# Patient Record
Sex: MALE | Race: WHITE | URBAN - METROPOLITAN AREA
[De-identification: names, ages, dates, MRNs, and addresses within clinical notes are randomized per-mention and may not be internally consistent; named-entity substitution may affect disease eponyms.]

---

## 2017-01-31 ENCOUNTER — IMPORTED ENCOUNTER (OUTPATIENT)
Dept: URBAN - METROPOLITAN AREA CLINIC 50 | Facility: CLINIC | Age: 57
End: 2017-01-31

## 2017-04-12 ENCOUNTER — IMPORTED ENCOUNTER (OUTPATIENT)
Dept: URBAN - METROPOLITAN AREA CLINIC 50 | Facility: CLINIC | Age: 57
End: 2017-04-12

## 2017-04-27 ENCOUNTER — IMPORTED ENCOUNTER (OUTPATIENT)
Dept: URBAN - METROPOLITAN AREA CLINIC 50 | Facility: CLINIC | Age: 57
End: 2017-04-27

## 2017-07-17 ENCOUNTER — IMPORTED ENCOUNTER (OUTPATIENT)
Dept: URBAN - METROPOLITAN AREA CLINIC 50 | Facility: CLINIC | Age: 57
End: 2017-07-17

## 2017-08-16 ENCOUNTER — IMPORTED ENCOUNTER (OUTPATIENT)
Dept: URBAN - METROPOLITAN AREA CLINIC 50 | Facility: CLINIC | Age: 57
End: 2017-08-16

## 2017-10-05 NOTE — PATIENT DISCUSSION
- OCT shows: borderline thin RNFL inferiorly OD (though with poor signal strength and peripapillary algorithm failure, likely due to cataract density)

## 2017-10-05 NOTE — PATIENT DISCUSSION
- Discussed the risks, benefits, and alternatives of cataract surgery including but not limited to infection, bleeding, posterior capsular tear, need for additional surgery including secondary IOL or vitrectomy, the continued need to wear glasses at both distance and near, and permanent loss of vision. There is no guarantee that cataract surgery will improve the patient's vision or reduce glare/halos.  The patient appears to understand and wishes to proceed with surgery, OS first.

## 2017-10-05 NOTE — PATIENT DISCUSSION
- I discussed a variety of IOL options with the patient and provided him with written information.  Discussed standard monofocal, multifocal, extended depth of focus, and toric IOL options

## 2017-10-17 NOTE — PATIENT DISCUSSION
- Discussed the risks, benefits, and alternatives of cataract surgery including but not limited to infection, bleeding, posterior capsular tear, need for additional surgery including secondary IOL or vitrectomy, the continued need to wear glasses at both distance and near, and permanent loss of vision. There is no guarantee that cataract surgery will improve the patient's vision or reduce glare/halos. The patient appears to understand and wishes to proceed with surgery.

## 2017-10-17 NOTE — PATIENT DISCUSSION
- Patient opts for standard monofocal IOL OU. He understands that he will likely still need glasses for both distance and near after surgery.

## 2017-10-17 NOTE — PATIENT DISCUSSION
- Patient has significant corneal astigmatism (&gt;1 diopter OU). Discussed the benefits of Toric IOLs and LRIs.

## 2017-12-20 NOTE — PATIENT DISCUSSION
Continue: Pred Forte (prednisolone acetate): drops,suspension: 1% 1 drop four times a day as directed into left eye 12-

## 2017-12-20 NOTE — PATIENT DISCUSSION
Continue: moxifloxacin (moxifloxacin): drops: 0.5% 1 drop four times a day as directed into left eye 12-

## 2017-12-28 NOTE — PATIENT DISCUSSION
- POD#1: Va 20/50, Ta 11, looks great, but still has approximately 2 diopters of astigmatism at 180 degrees

## 2017-12-28 NOTE — PATIENT DISCUSSION
Continue: Pred Forte (prednisolone acetate): drops,suspension: 1% 1 drop three times a day as directed into left eye 12-

## 2018-01-18 NOTE — PATIENT DISCUSSION
- New glasses Rx provided. Warned patient that he may have a significant difference in image size between the two eyes.

## 2018-01-18 NOTE — PATIENT DISCUSSION
- Will observe until his cataract becomes more significant.  Surgery would be slightly higher risk due to PXF

## 2018-02-23 NOTE — PATIENT DISCUSSION
- Narrow angle OD as well, will consider surgery earlier if his angle becomes narrower or if his glaucoma appears to be progressing

## 2018-06-07 NOTE — PATIENT DISCUSSION
POSTERIOR VITREOUS DETACHMENT, OU: NO HOLES. NO TEARS. RETINAL DETACHMENT WARNINGS DISCUSSED. FLOATERS AND FLASHES BROCHURE GIVEN. RETURN FOR FOLLOW-UP AS SCHEDULED.

## 2018-06-07 NOTE — PATIENT DISCUSSION
POAG, OU: INTRAOCULAR PRESSURE IS WITHIN ACCEPTABLE LIMITS. PT INSTRUCTED TO CONTINUE LATANOPROST QHS OD- AND START IN OS AS OF TODAY. RX ESCROBED TODAY- AND RETURN FOR FOLLOW-UP AS SCHEDULED.

## 2018-08-16 ENCOUNTER — IMPORTED ENCOUNTER (OUTPATIENT)
Dept: URBAN - METROPOLITAN AREA CLINIC 50 | Facility: CLINIC | Age: 58
End: 2018-08-16

## 2018-09-26 ENCOUNTER — IMPORTED ENCOUNTER (OUTPATIENT)
Dept: URBAN - METROPOLITAN AREA CLINIC 50 | Facility: CLINIC | Age: 58
End: 2018-09-26

## 2019-08-14 ENCOUNTER — PREPPED CHART (OUTPATIENT)
Dept: URBAN - METROPOLITAN AREA CLINIC 52 | Facility: CLINIC | Age: 59
End: 2019-08-14

## 2019-08-14 ENCOUNTER — IMPORTED ENCOUNTER (OUTPATIENT)
Dept: URBAN - METROPOLITAN AREA CLINIC 50 | Facility: CLINIC | Age: 59
End: 2019-08-14

## 2019-09-26 NOTE — PATIENT DISCUSSION
POAG, OU: INTRAOCULAR PRESSURE IS WITHIN ACCEPTABLE LIMITS. PT INSTRUCTED TO CONTINUE LATANOPROST QHS OU. E-SCRIBED REFILL TODAY. OCT SHOWS THINNING. WILL HAVE PT RETURN FOR FURTHER GLAUCOMA TESTING.

## 2020-09-24 NOTE — PATIENT DISCUSSION
09/24/2020-3.50+[id number]/30 -&nbsp;SN &nbsp; &nbsp; [facility]

## 2020-09-24 NOTE — PATIENT DISCUSSION
POAG, OU: INTRAOCULAR PRESSURE AND RNFL OCT ARE WITHIN NORMAL LIMITS. PT INSTRUCTED TO CONTINUE LATANOPROST QHS OU AND  TIMOLOL QAM OS AND RETURN FOR FOLLOW-UP AS SCHEDULED.  ESCRIBED REFILL OF LATANOPROST AND TIMOLOL TODAY

## 2021-03-26 NOTE — PATIENT DISCUSSION
POAG, OU: INTRAOCULAR PRESSURE IS WITHIN ACCEPTABLE LIMITS. PT INSTRUCTED TO CONTINUE LATANOPROST QHS OU AND TIMOLOL QAM OS AND RETURN FOR FOLLOW-UP AS SCHEDULED.

## 2021-04-17 ASSESSMENT — TONOMETRY
OS_IOP_MMHG: 16
OD_IOP_MMHG: 18
OS_IOP_MMHG: 15
OD_IOP_MMHG: 16
OS_IOP_MMHG: 18
OD_IOP_MMHG: 15
OS_IOP_MMHG: 15
OD_IOP_MMHG: 16

## 2021-04-17 ASSESSMENT — VISUAL ACUITY
OS_CC: J1+
OD_BAT: 20/30-1
OD_CC: 20/25-1
OD_CC: 20/20-2
OS_CC: 20/20-2
OS_CC: 20/25+1
OD_CC: J1+
OD_CC: J1+@ 14 IN
OS_CC: 20/20-
OS_OTHER: 20/25. 20/25-2.
OD_CC: 20/30+-
OS_CC: 20/20
OD_OTHER: 20/30-1. 20/30-1.
OS_CC: J1+@ 14 IN
OD_CC: 20/25-
OS_BAT: 20/25

## 2021-04-28 ENCOUNTER — ROUTINE EXAM (OUTPATIENT)
Dept: URBAN - METROPOLITAN AREA CLINIC 52 | Facility: CLINIC | Age: 61
End: 2021-04-28

## 2021-04-28 DIAGNOSIS — H04.123: ICD-10-CM

## 2021-04-28 DIAGNOSIS — H52.4: ICD-10-CM

## 2021-04-28 DIAGNOSIS — Z01.00: ICD-10-CM

## 2021-04-28 PROCEDURE — 92012 INTRM OPH EXAM EST PATIENT: CPT

## 2021-04-28 PROCEDURE — 92015 DETERMINE REFRACTIVE STATE: CPT

## 2021-04-28 ASSESSMENT — VISUAL ACUITY
OU_CC: 20/20
OU_CC: J1+
OD_CC: 20/20
OS_CC: 20/20

## 2021-04-28 ASSESSMENT — TONOMETRY
OS_IOP_MMHG: 16
OD_IOP_MMHG: 16

## 2021-04-28 NOTE — PATIENT DISCUSSION
Discussed lid hygiene, warm compress and eyelid wash. Advised patient to not use baby shampoo. Gave samples of Ocusoft Lid Scrubs. Will continue to monitor.

## 2021-04-28 NOTE — PATIENT DISCUSSION
Continue current management. Advised patient to not use Visine eye drops. Gave sample of Systane Complete. Will continue to monitor.

## 2021-07-09 NOTE — PATIENT DISCUSSION
POAG, OU: HVF STABLE OU. PT INSTRUCTED TO CONTINUE LATANOPROST QHS OU  AND RETURN FOR FOLLOW-UP AS SCHEDULED.

## 2022-10-03 NOTE — PATIENT DISCUSSION
PT INSTRUCTED TO RESTART LATANOPROST QHS OU, TIMOLOL QD OS,  PMH DISCUSSED WHEN SHE IS READY FOR CATARACT SURGERY AND HAS VISION COMPLAINTS WILL REFER TO PATIENT DR Miguel Inman FOR CATARACT/GLAUCOMA SURGERY.

## 2022-10-19 ENCOUNTER — COMPREHENSIVE EXAM (OUTPATIENT)
Dept: URBAN - METROPOLITAN AREA CLINIC 52 | Facility: CLINIC | Age: 62
End: 2022-10-19

## 2022-10-19 DIAGNOSIS — H02.051: ICD-10-CM

## 2022-10-19 DIAGNOSIS — H52.4: ICD-10-CM

## 2022-10-19 DIAGNOSIS — Z01.00: ICD-10-CM

## 2022-10-19 PROCEDURE — 67820 REVISE EYELASHES: CPT

## 2022-10-19 PROCEDURE — 92014 COMPRE OPH EXAM EST PT 1/>: CPT

## 2022-10-19 PROCEDURE — 92015 DETERMINE REFRACTIVE STATE: CPT

## 2022-10-19 ASSESSMENT — VISUAL ACUITY
OU_CC: 20/20-1
OU_CC: J1+
OS_CC: 20/25-2
OD_CC: 20/20-1

## 2022-10-19 ASSESSMENT — TONOMETRY
OS_IOP_MMHG: 13
OD_IOP_MMHG: 13

## 2022-10-19 NOTE — PATIENT DISCUSSION
IOP 13 OU. Due to large c/d will order baseline testing to include HVF 24-2 MIGUEL standard and OCT (RNFL). Will see patient back in 6 month for IOP check and HVF 24-2 MIGUEL standard and OCT (RNFL).

## 2022-10-19 NOTE — PROCEDURE NOTE: CLINICAL
PROCEDURE NOTE: Epilation Right Upper Lid. Diagnosis: Trichiasis without Entropion. Consent was obtained prior to the procedure. Patient was brought to slit lamp where trichiasis was/were removed using micro forceps. Patient tolerated procedure well. Cristine Holliday

## 2022-11-02 ENCOUNTER — DIAGNOSTICS ONLY (OUTPATIENT)
Dept: URBAN - METROPOLITAN AREA CLINIC 52 | Facility: CLINIC | Age: 62
End: 2022-11-02

## 2022-11-02 DIAGNOSIS — H40.013: ICD-10-CM

## 2022-11-02 PROCEDURE — 92083 EXTENDED VISUAL FIELD XM: CPT

## 2022-11-02 PROCEDURE — 92133 CPTRZD OPH DX IMG PST SGM ON: CPT

## 2023-05-17 ENCOUNTER — DIAGNOSTICS ONLY (OUTPATIENT)
Dept: URBAN - METROPOLITAN AREA CLINIC 52 | Facility: CLINIC | Age: 63
End: 2023-05-17

## 2023-05-17 DIAGNOSIS — H40.013: ICD-10-CM

## 2023-05-17 PROCEDURE — 92133 CPTRZD OPH DX IMG PST SGM ON: CPT

## 2023-05-17 PROCEDURE — 92083 EXTENDED VISUAL FIELD XM: CPT

## 2023-11-15 ENCOUNTER — COMPREHENSIVE EXAM (OUTPATIENT)
Dept: URBAN - METROPOLITAN AREA CLINIC 52 | Facility: CLINIC | Age: 63
End: 2023-11-15

## 2023-11-15 DIAGNOSIS — H52.4: ICD-10-CM

## 2023-11-15 DIAGNOSIS — Z01.00: ICD-10-CM

## 2023-11-15 PROCEDURE — 92015 DETERMINE REFRACTIVE STATE: CPT

## 2023-11-15 PROCEDURE — 92014 COMPRE OPH EXAM EST PT 1/>: CPT

## 2023-11-15 ASSESSMENT — KERATOMETRY
OD_AXISANGLE_DEGREES: 013
OS_K2POWER_DIOPTERS: 44.00
OS_AXISANGLE_DEGREES: 002
OS_K1POWER_DIOPTERS: 43.25
OD_K2POWER_DIOPTERS: 43.50
OS_AXISANGLE2_DEGREES: 92
OD_AXISANGLE2_DEGREES: 103
OD_K1POWER_DIOPTERS: 43.00

## 2023-11-15 ASSESSMENT — VISUAL ACUITY
OS_CC: 20/20
OD_CC: 20/20
OU_CC: J1+
OD_GLARE: 20/20
OS_GLARE: 20/20
OS_GLARE: 20/20
OU_CC: 20/20
OD_GLARE: 20/20

## 2023-11-15 ASSESSMENT — TONOMETRY
OD_IOP_MMHG: 16
OS_IOP_MMHG: 16

## 2025-08-04 ENCOUNTER — APPOINTMENT (OUTPATIENT)
Dept: URBAN - METROPOLITAN AREA CLINIC 164 | Facility: CLINIC | Age: 65
Setting detail: DERMATOLOGY
End: 2025-08-04

## 2025-08-04 DIAGNOSIS — L82.1 OTHER SEBORRHEIC KERATOSIS: ICD-10-CM

## 2025-08-04 DIAGNOSIS — A63.0 ANOGENITAL (VENEREAL) WARTS: ICD-10-CM | Status: INADEQUATELY CONTROLLED

## 2025-08-04 DIAGNOSIS — L40.8 OTHER PSORIASIS: ICD-10-CM | Status: INADEQUATELY CONTROLLED

## 2025-08-04 PROBLEM — D40.8 NEOPLASM OF UNCERTAIN BEHAVIOR OF OTHER SPECIFIED MALE GENITAL ORGANS: Status: ACTIVE | Noted: 2025-08-04

## 2025-08-04 PROBLEM — L60.9 NAIL DISORDER, UNSPECIFIED: Status: ACTIVE | Noted: 2025-08-04

## 2025-08-04 PROCEDURE — ? COUNSELING

## 2025-08-04 PROCEDURE — ? ADDITIONAL NOTES

## 2025-08-04 ASSESSMENT — LOCATION DETAILED DESCRIPTION DERM
LOCATION DETAILED: LEFT SUPERIOR PREAURICULAR CHEEK
LOCATION DETAILED: LEFT DISTAL ULNAR THUMB
LOCATION DETAILED: RIGHT SUPERIOR HELIX
LOCATION DETAILED: PERIUNGUAL SKIN RIGHT THUMB
LOCATION DETAILED: LEFT SUPERIOR LATERAL MALAR CHEEK
LOCATION DETAILED: LEFT DORSAL SHAFT OF PENIS

## 2025-08-04 ASSESSMENT — LOCATION SIMPLE DESCRIPTION DERM
LOCATION SIMPLE: RIGHT THUMB
LOCATION SIMPLE: PENIS
LOCATION SIMPLE: RIGHT EAR
LOCATION SIMPLE: LEFT CHEEK
LOCATION SIMPLE: LEFT THUMB

## 2025-08-04 ASSESSMENT — ITCH NUMERIC RATING SCALE: HOW SEVERE IS YOUR ITCHING?: 0

## 2025-08-04 ASSESSMENT — BSA PSORIASIS: % BODY COVERED IN PSORIASIS: 2

## 2025-08-04 ASSESSMENT — LOCATION ZONE DERM
LOCATION ZONE: FACE
LOCATION ZONE: FINGER
LOCATION ZONE: EAR
LOCATION ZONE: PENIS

## 2025-08-04 NOTE — HPI: PSORIASIS (PATIENT REPORTED)
Where Is Your Psoriasis Located?: Fingers
List Prescription Topical Steroids You Are Using (Separate Each Name With A Comma):: Clobetasol

## 2025-08-04 NOTE — PROCEDURE: ADDITIONAL NOTES
Render Risk Assessment In Note?: no
Additional Notes: Provider performed nail culture today, sent to [facility]. Pending results.
Detail Level: Zone
Additional Notes: Patient worried about the possibility of HPV. Advised that biopsy can be performed and if confirmed as condyloma, high risk HPV testing can be requested. He previously booked an appointment with urology and its upcoming in 2 days. He’ll look for a second opinion and will  let us know if he wants to proceed with biopsy

## 2025-08-07 ENCOUNTER — COMPREHENSIVE EXAM (OUTPATIENT)
Age: 65
End: 2025-08-07

## 2025-08-07 DIAGNOSIS — H52.4: ICD-10-CM

## 2025-08-07 DIAGNOSIS — H25.13: ICD-10-CM

## 2025-08-07 DIAGNOSIS — H40.013: ICD-10-CM

## 2025-08-07 PROCEDURE — 99214 OFFICE O/P EST MOD 30 MIN: CPT
